# Patient Record
Sex: MALE | Race: WHITE | NOT HISPANIC OR LATINO | Employment: OTHER | RURAL
[De-identification: names, ages, dates, MRNs, and addresses within clinical notes are randomized per-mention and may not be internally consistent; named-entity substitution may affect disease eponyms.]

---

## 2018-04-26 ENCOUNTER — CONVERSION ENCOUNTER (OUTPATIENT)
Dept: CARDIOLOGY | Facility: CLINIC | Age: 78
End: 2018-04-26

## 2018-04-26 ENCOUNTER — OFFICE VISIT CONVERTED (OUTPATIENT)
Dept: CARDIOLOGY | Facility: CLINIC | Age: 78
End: 2018-04-26
Attending: SPECIALIST

## 2018-10-25 ENCOUNTER — OFFICE VISIT CONVERTED (OUTPATIENT)
Dept: CARDIOLOGY | Facility: CLINIC | Age: 78
End: 2018-10-25
Attending: SPECIALIST

## 2019-05-09 ENCOUNTER — CONVERSION ENCOUNTER (OUTPATIENT)
Dept: CARDIOLOGY | Facility: CLINIC | Age: 79
End: 2019-05-09

## 2019-05-09 ENCOUNTER — OFFICE VISIT CONVERTED (OUTPATIENT)
Dept: CARDIOLOGY | Facility: CLINIC | Age: 79
End: 2019-05-09
Attending: SPECIALIST

## 2019-11-21 ENCOUNTER — OFFICE VISIT CONVERTED (OUTPATIENT)
Dept: CARDIOLOGY | Facility: CLINIC | Age: 79
End: 2019-11-21
Attending: SPECIALIST

## 2019-11-21 ENCOUNTER — CONVERSION ENCOUNTER (OUTPATIENT)
Dept: CARDIOLOGY | Facility: CLINIC | Age: 79
End: 2019-11-21

## 2020-04-06 ENCOUNTER — TELEMEDICINE CONVERTED (OUTPATIENT)
Dept: CARDIOLOGY | Facility: CLINIC | Age: 80
End: 2020-04-06
Attending: SPECIALIST

## 2021-01-14 ENCOUNTER — OFFICE VISIT CONVERTED (OUTPATIENT)
Dept: CARDIOLOGY | Facility: CLINIC | Age: 81
End: 2021-01-14
Attending: SPECIALIST

## 2021-01-14 ENCOUNTER — CONVERSION ENCOUNTER (OUTPATIENT)
Dept: OTHER | Facility: HOSPITAL | Age: 81
End: 2021-01-14

## 2021-05-12 NOTE — PROGRESS NOTES
Quick Note      Patient Name: BELLA KIDD   Patient ID: [id number]   Sex: Male   YOB: 1940    Primary Care Provider: David Agosto MD   Referring Provider: David Agosto MD    Visit Date: April 6, 2020    Provider: Perez Rouse MD   Location: [facility]   Location Address: [address] [place] [address]  [id number]   Location Phone: [phone]          History Of Present Illness  TELEHEALTH VISIT  Chief Complaint: Hypertension   BELLA KIDD is a 79 year old /White male with history of hypertension who is presenting for evaluation via telehealth visit. Verbal consent obtained before beginning visit. His blood pressure is well controlled at home. He continues to smoke. No chest pain or shortness of breath.   Provider spent 6 minutes with the patient during telehealth visit.   The patients wife was present during this visit.   Past Medical History/Overview of Patient Symptoms     PAST MEDICAL HISTORY: Positive for hypertension, hyperlipidemia.  FAMILY HISTORY: Negative for diabetes and hypertension; Positive for heart disease.    Vitals per patient. BP: 121/69. HRT: 75. WT: 155. HT: 5'6.    MEDICATIONS: ASA 81 mg qd; Metoprolol 25 mg qd; Vit B- mcg qd; Rosuvastatin 10 mg qd; Ramipril 10 mg qd; Coenzyme 100 mg qd; Vit D-2-1.25 mg q week; Loratadine 10 mg qd.    PSYCHO/SOCIAL HISTORY Negative for mood changes and depression. Rarely drinks alcohol and does smoke.    IMPRESSION/PLAN  1. Essential hypertension controlled. Continue current dose of Metoprolol and Ramipril.  2. Hyperlipidemia. Continue current dose of Rosuvastatin managed by his PMD.  3. Positive for nicotine use. Smoking cessation instructions were discussed with the patient again.  4. See me back in six months.     Perez Rouse MD  DARY/wt    Telehealth visit due to COVID-19           Plan  · Instructions  o Plan Of Care:             Electronically Signed by: Lucy  Tremaine-, -Author on April 10, 2020 07:54:15 AM  Electronically Co-signed by: Perez Rouse MD -Reviewer on April 12, 2020 08:59:18 AM

## 2021-05-14 VITALS — BODY MASS INDEX: 25.18 KG/M2 | HEIGHT: 65 IN | WEIGHT: 151.12 LBS

## 2021-05-14 NOTE — PROGRESS NOTES
"   Progress Note      Patient Name: BELLA KIDD   Patient ID: [id number]   Sex: Male   YOB: 1940    Primary Care Provider: David Agosto MD   Referring Provider: David Agosto MD    Visit Date: January 14, 2021    Provider: Perez Rouse MD   Location: [facility]   Location Address: [address] [place] [address]  [id number]   Location Phone: [phone]          Chief Complaint  · Hypertension      History Of Present Illness  BELLA KIDD is an 80 year old /White male with a history of hypertension; hyperlipidemia. Blood pressure controlled at home. Patient denies chest pain. No shortness of breath, PND or orthopnea. Patient continues to smoke.   Medications  Meds at present include: ASA 81 mg qd; Metoprolol 25 mg qd; Niacin 500 mg qd; Rosuvastatin 10 mg qd; Altace 10 mg qd; Coenzyme 100 mg qd; Drisdol 50,000 unit q week; Advair Diskus 100/50 bid.   PAST MEDICAL HISTORY: negative for diabetes and chest pain; positive for hypertension.   PSYCHO/SOCIAL HISTORY: Positive for smoking and rarely drinks alcohol.       Review of Systems  · Cardiovascular  o Denies  o : palpitations (fast, fluttering, or skipping beats), swelling (feet, ankles, hands), shortness of breath while walking or lying flat, chest pain or angina pectoris   · Respiratory  o Denies  o : asthma or wheezing      Vitals  Date Time BP Position Site L\R Cuff Size HR RR TEMP (F) WT  HT  BMI kg/m2 BSA m2 O2 Sat FR L/min FiO2 HC       01/14/2021 03:32 PM         151lbs 2oz 5'  5\" 25.15 1.77             Physical Examination  · Constitutional  o Appearance  o : Alert and Oriented X3. The patient is well built and well nourished.  · Respiratory  o Inspection of Chest  o : No chest wall deformities, moving equal  o Auscultation of Lungs  o : Good air entry with vesicular breath sounds  · Cardiovascular  o Heart  o :   § Auscultation of Heart  § : S1 and S2 are regular. No S3. No S4. No " murmurs.  o Peripheral Vascular System  o :   § Extremities  § : Peripheral pulses were well felt. No edema. No cyanosis.  · Gastrointestinal  o Abdominal Examination  o : No masses or tenderness noted.   · EKG  o EKG  o : was not performed in the office today          Assessment     IMPRESSION/PLAN    1.  Essential hypertension controlled.  Continue current dose of Altace and Metoprolol.  2.  Hyperlipidemia. Continue current dose of Rosuvastatin, managed by his PMD.  3.  Positive for nicotine use. Smoking instructions was discussed with the patient.  4.  See me back in 6 months.      MD DARY Villagomez/wt       Plan  · Instructions  o This note was transcribed by Maria R Penaloza. DARY/wt  o The above service was transcribed by Maria R Penaloza on my behalf and I attest to the accuracy of the note. DARY            Electronically Signed by: Lucy Penaloza-, -Author on January 18, 2021 03:02:47 PM  Electronically Co-signed by: Perez Rouse MD -Reviewer on January 20, 2021 10:45:44 AM

## 2021-05-15 VITALS
BODY MASS INDEX: 25.13 KG/M2 | HEIGHT: 66 IN | WEIGHT: 156.37 LBS | HEART RATE: 70 BPM | SYSTOLIC BLOOD PRESSURE: 110 MMHG | DIASTOLIC BLOOD PRESSURE: 60 MMHG

## 2021-05-15 VITALS
BODY MASS INDEX: 25.75 KG/M2 | DIASTOLIC BLOOD PRESSURE: 70 MMHG | WEIGHT: 160.25 LBS | HEIGHT: 66 IN | HEART RATE: 70 BPM | SYSTOLIC BLOOD PRESSURE: 130 MMHG

## 2021-05-16 VITALS
HEART RATE: 63 BPM | BODY MASS INDEX: 24.77 KG/M2 | DIASTOLIC BLOOD PRESSURE: 71 MMHG | SYSTOLIC BLOOD PRESSURE: 145 MMHG | HEIGHT: 66 IN | WEIGHT: 154.12 LBS

## 2021-05-16 VITALS
WEIGHT: 154.12 LBS | HEIGHT: 65 IN | SYSTOLIC BLOOD PRESSURE: 157 MMHG | BODY MASS INDEX: 25.68 KG/M2 | DIASTOLIC BLOOD PRESSURE: 71 MMHG | HEART RATE: 65 BPM

## 2021-11-17 PROBLEM — I10 HYPERTENSION, ESSENTIAL: Status: ACTIVE | Noted: 2021-11-17

## 2021-11-17 PROBLEM — E78.2 HYPERLIPEMIA, MIXED: Status: ACTIVE | Noted: 2021-11-17

## 2021-11-17 NOTE — PROGRESS NOTES
[facility]  Cardiology progress Note    Patient Name: Joe Castorena  : 1940    CHIEF COMPLAINT  Hypertension      Subjective   Subjective     HISTORY OF PRESENT ILLNESS    Joe Castorena is a 81 y.o. male with history of hypertension and hyperlipidemia.  No chest pain or shortness of breath.    Review of Systems:   Constitutional no fever,  no weight loss   Skin no rash   Otolaryngeal no difficulty swallowing   Cardiovascular See HPI   Pulmonary no cough, no sputum production   Gastrointestinal no constipation, no diarrhea   Genitourinary no dysuria, no hematuria   Hematologic no easy bruisability, no abnormal bleeding   Musculoskeletal no muscle pain   Neurologic no dizziness, no falls         Personal History     Social History:      Home Medications:  No current outpatient medications on file prior to visit.     No current facility-administered medications on file prior to visit.     Allergies:  Not on File    Objective    Objective       Vitals:   BP: ()/()   Arterial Line BP: ()/()   There is no height or weight on file to calculate BMI.     Physical Exam:   Constitutional: Awake, alert, No acute distress    Eyes: PERRLA, sclerae anicteric, no conjunctival injection   HENT: NCAT, mucous membranes moist   Neck: Supple, no thyromegaly, no lymphadenopathy, trachea midline   Respiratory: Clear to auscultation bilaterally, nonlabored respirations    Cardiovascular: RRR, no murmurs or rubs. Palpable pedal pulses bilaterally   Musculoskeletal: No bilateral ankle edema, no cyanosis to extremities   Psychiatric: Appropriate affect, cooperative   Neurologic: Oriented x 3, strength symmetric in all extremities, Cranial Nerves grossly intact to confrontation, speech clear   Skin: No rashes.    Result Review    Result Review:  I have personally reviewed the available results from  [x]  Laboratory  [x]  EKG  [x]  Cardiology  [x]  Medications  [x]  Old records  []  Other:   Procedures       Impression/Plan:  1.  Essential hypertension controlled: Continue Altace 10 mg once a day.  Continue metoprolol 25 mg once a day.  2.  Hyperlipidemia: Continue Crestor 10 mg once a day.  3.  Positive nicotine use: Smoking cessation discussed with patient.           Perez Rouse MD   11/17/21   10:39 EST

## 2021-11-18 ENCOUNTER — OFFICE VISIT (OUTPATIENT)
Dept: CARDIOLOGY | Facility: CLINIC | Age: 81
End: 2021-11-18

## 2021-11-18 VITALS
SYSTOLIC BLOOD PRESSURE: 154 MMHG | WEIGHT: 146.6 LBS | BODY MASS INDEX: 25.98 KG/M2 | HEIGHT: 63 IN | HEART RATE: 64 BPM | DIASTOLIC BLOOD PRESSURE: 72 MMHG

## 2021-11-18 DIAGNOSIS — E78.2 HYPERLIPEMIA, MIXED: ICD-10-CM

## 2021-11-18 DIAGNOSIS — Z72.0 NICOTINE USE: ICD-10-CM

## 2021-11-18 DIAGNOSIS — I10 HYPERTENSION, ESSENTIAL: Primary | ICD-10-CM

## 2021-11-18 PROCEDURE — 99213 OFFICE O/P EST LOW 20 MIN: CPT | Performed by: SPECIALIST

## 2021-11-18 RX ORDER — ERGOCALCIFEROL 1.25 MG/1
50000 CAPSULE ORAL WEEKLY
COMMUNITY
Start: 2021-10-13

## 2021-11-18 RX ORDER — METOPROLOL SUCCINATE 25 MG/1
25 TABLET, EXTENDED RELEASE ORAL DAILY
COMMUNITY
Start: 2021-10-27 | End: 2022-01-20

## 2021-11-18 RX ORDER — RAMIPRIL 10 MG/1
10 CAPSULE ORAL DAILY
COMMUNITY
Start: 2021-10-11

## 2021-11-18 RX ORDER — LORATADINE 10 MG/1
10 TABLET ORAL DAILY
COMMUNITY
Start: 2021-11-10

## 2021-11-18 RX ORDER — ROSUVASTATIN CALCIUM 10 MG/1
10 TABLET, COATED ORAL
COMMUNITY
Start: 2021-10-05

## 2021-11-18 RX ORDER — ASPIRIN 81 MG/1
81 TABLET ORAL DAILY
COMMUNITY

## 2021-11-18 RX ORDER — LANOLIN ALCOHOL/MO/W.PET/CERES
500 CREAM (GRAM) TOPICAL 2 TIMES DAILY WITH MEALS
COMMUNITY

## 2022-01-20 RX ORDER — METOPROLOL SUCCINATE 25 MG/1
TABLET, EXTENDED RELEASE ORAL
Qty: 90 TABLET | Refills: 3 | Status: SHIPPED | OUTPATIENT
Start: 2022-01-20 | End: 2022-10-03

## 2022-02-23 PROBLEM — Z72.0 NICOTINE USE: Status: ACTIVE | Noted: 2022-02-23

## 2022-02-24 ENCOUNTER — OFFICE VISIT (OUTPATIENT)
Dept: CARDIOLOGY | Facility: CLINIC | Age: 82
End: 2022-02-24

## 2022-02-24 VITALS
HEART RATE: 72 BPM | BODY MASS INDEX: 22.66 KG/M2 | HEIGHT: 65 IN | SYSTOLIC BLOOD PRESSURE: 143 MMHG | WEIGHT: 136 LBS | DIASTOLIC BLOOD PRESSURE: 68 MMHG

## 2022-02-24 DIAGNOSIS — E78.2 HYPERLIPEMIA, MIXED: ICD-10-CM

## 2022-02-24 DIAGNOSIS — I10 HYPERTENSION, ESSENTIAL: Primary | ICD-10-CM

## 2022-02-24 DIAGNOSIS — Z72.0 NICOTINE USE: ICD-10-CM

## 2022-02-24 PROCEDURE — 99213 OFFICE O/P EST LOW 20 MIN: CPT | Performed by: SPECIALIST

## 2022-02-24 NOTE — PROGRESS NOTES
[facility]  Cardiology progress Note    Patient Name: Joe Castorena  : 1940    CHIEF COMPLAINT  Hypertension.      Subjective   Subjective     HISTORY OF PRESENT ILLNESS    Joe Castorena is a 81 y.o. male history of hypertension.  No chest pain or shortness of breath.    Review of Systems:   Constitutional no fever,  no weight loss   Skin no rash   Otolaryngeal no difficulty swallowing   Cardiovascular See HPI   Pulmonary no cough, no sputum production   Gastrointestinal no constipation, no diarrhea   Genitourinary no dysuria, no hematuria   Hematologic no easy bruisability, no abnormal bleeding   Musculoskeletal no muscle pain   Neurologic no dizziness, no falls         Personal History     Social History:  reports that he has been smoking. He has never used smokeless tobacco. He reports current alcohol use. He reports that he does not use drugs.    Home Medications:  Current Outpatient Medications on File Prior to Visit   Medication Sig   • aspirin 81 MG EC tablet Take 81 mg by mouth Daily.   • Coenzyme Q10 (CoQ-10) 100 MG capsule Take  by mouth.   • fluticasone-salmeterol (ADVAIR) 100-50 MCG/DOSE DISKUS INHALE 1 PUFF BY MOUTH EVERY 12 HOURS   • loratadine (CLARITIN) 10 MG tablet Take 10 mg by mouth Daily.   • metoprolol succinate XL (TOPROL-XL) 25 MG 24 hr tablet TAKE 1 TABLET BY MOUTH EVERY DAY   • niacin (SLO-NIACIN) 500 MG CR tablet Take 500 mg by mouth 2 (Two) Times a Day With Meals.   • ramipril (ALTACE) 10 MG capsule Take 10 mg by mouth Daily.   • rosuvastatin (CRESTOR) 10 MG tablet Take 10 mg by mouth every night at bedtime.   • vitamin D (ERGOCALCIFEROL) 1.25 MG (24290 UT) capsule capsule Take 50,000 Units by mouth 1 (One) Time Per Week.     No current facility-administered medications on file prior to visit.     Allergies:  No Known Allergies    Objective    Objective       Vitals:   Heart Rate:  [72] 72  BP: (143)/(68) 143/68  Body mass index is 22.63 kg/m².     Physical  Exam:   Constitutional: Awake, alert, No acute distress    Eyes: PERRLA, sclerae anicteric, no conjunctival injection   HENT: NCAT, mucous membranes moist   Neck: Supple, no thyromegaly, no lymphadenopathy, trachea midline   Respiratory: Clear to auscultation bilaterally, nonlabored respirations    Cardiovascular: RRR, no murmurs or rubs. Palpable pedal pulses bilaterally   Musculoskeletal: No bilateral ankle edema, no cyanosis to extremities   Psychiatric: Appropriate affect, cooperative   Neurologic: Oriented x 3, strength symmetric in all extremities, Cranial Nerves grossly intact to confrontation, speech clear   Skin: No rashes.    Result Review    Result Review:  I have personally reviewed the available results from  [x]  Laboratory  [x]  EKG  [x]  Cardiology  [x]  Medications  [x]  Old records  []  Other:   Procedures    Impression/Plan:  1.  Essential hypertension controlled: Continue Altace 10 mg a day.  Continue Toprol-XL 25 mg once a day.  2.  Hyperlipidemia: Continue Crestor 10 mg once a day.  3.  Positive nicotine use: Smoking cessation discussed with patient.           Perez Rouse MD   02/24/22   10:49 EST

## 2022-10-03 RX ORDER — METOPROLOL SUCCINATE 25 MG/1
TABLET, EXTENDED RELEASE ORAL
Qty: 90 TABLET | Refills: 1 | Status: SHIPPED | OUTPATIENT
Start: 2022-10-03